# Patient Record
Sex: FEMALE | Race: WHITE | NOT HISPANIC OR LATINO | URBAN - METROPOLITAN AREA
[De-identification: names, ages, dates, MRNs, and addresses within clinical notes are randomized per-mention and may not be internally consistent; named-entity substitution may affect disease eponyms.]

---

## 2023-08-25 ENCOUNTER — OFFICE VISIT (OUTPATIENT)
Dept: FAMILY MEDICINE CLINIC | Facility: CLINIC | Age: 33
End: 2023-08-25
Payer: COMMERCIAL

## 2023-08-25 VITALS
HEART RATE: 103 BPM | DIASTOLIC BLOOD PRESSURE: 73 MMHG | OXYGEN SATURATION: 97 % | BODY MASS INDEX: 26.61 KG/M2 | SYSTOLIC BLOOD PRESSURE: 107 MMHG | HEIGHT: 59 IN | TEMPERATURE: 95.4 F | WEIGHT: 132 LBS

## 2023-08-25 DIAGNOSIS — Z76.89 ENCOUNTER TO ESTABLISH CARE: Primary | ICD-10-CM

## 2023-08-25 DIAGNOSIS — F43.10 PTSD (POST-TRAUMATIC STRESS DISORDER): ICD-10-CM

## 2023-08-25 DIAGNOSIS — F41.1 GAD (GENERALIZED ANXIETY DISORDER): ICD-10-CM

## 2023-08-25 DIAGNOSIS — Z86.59 HISTORY OF DEPRESSION: ICD-10-CM

## 2023-08-25 PROCEDURE — 99204 OFFICE O/P NEW MOD 45 MIN: CPT | Performed by: NURSE PRACTITIONER

## 2023-08-25 RX ORDER — GABAPENTIN 100 MG/1
100 CAPSULE ORAL 3 TIMES DAILY
COMMUNITY
Start: 2023-08-07

## 2023-08-25 RX ORDER — PAROXETINE 10 MG/1
15 TABLET, FILM COATED ORAL DAILY
COMMUNITY
Start: 2023-07-31 | End: 2023-08-25 | Stop reason: SDUPTHER

## 2023-08-25 RX ORDER — PRAZOSIN HYDROCHLORIDE 1 MG/1
1 CAPSULE ORAL 3 TIMES DAILY
COMMUNITY
Start: 2023-07-31 | End: 2023-08-25 | Stop reason: SDUPTHER

## 2023-08-25 RX ORDER — IBUPROFEN 600 MG/1
600 TABLET ORAL EVERY 6 HOURS PRN
COMMUNITY
Start: 2023-06-17 | End: 2024-06-16

## 2023-08-25 RX ORDER — DOXEPIN HYDROCHLORIDE 50 MG/1
150 CAPSULE ORAL
Qty: 30 CAPSULE | Refills: 0 | Status: SHIPPED | OUTPATIENT
Start: 2023-08-25

## 2023-08-25 RX ORDER — BUPRENORPHINE AND NALOXONE 8; 2 MG/1; MG/1
FILM, SOLUBLE BUCCAL; SUBLINGUAL DAILY
COMMUNITY

## 2023-08-25 RX ORDER — QUETIAPINE FUMARATE 100 MG/1
100 TABLET, FILM COATED ORAL 2 TIMES DAILY
Qty: 120 TABLET | Refills: 0 | Status: SHIPPED | OUTPATIENT
Start: 2023-08-25

## 2023-08-25 RX ORDER — PAROXETINE 10 MG/1
15 TABLET, FILM COATED ORAL DAILY
Qty: 90 TABLET | Refills: 0 | Status: SHIPPED | OUTPATIENT
Start: 2023-08-25

## 2023-08-25 RX ORDER — PRAZOSIN HYDROCHLORIDE 1 MG/1
1 CAPSULE ORAL 3 TIMES DAILY
Qty: 90 CAPSULE | Refills: 1 | Status: SHIPPED | OUTPATIENT
Start: 2023-08-25

## 2023-08-25 RX ORDER — QUETIAPINE FUMARATE 300 MG/1
300 TABLET, FILM COATED ORAL
COMMUNITY
Start: 2023-08-10 | End: 2023-08-25 | Stop reason: SDUPTHER

## 2023-08-25 RX ORDER — QUETIAPINE FUMARATE 300 MG/1
300 TABLET, FILM COATED ORAL
Qty: 60 TABLET | Refills: 0 | Status: SHIPPED | OUTPATIENT
Start: 2023-08-25

## 2023-08-25 RX ORDER — NALOXONE HYDROCHLORIDE 4 MG/.1ML
SPRAY NASAL
COMMUNITY
Start: 2023-07-31

## 2023-08-25 RX ORDER — DOXEPIN HYDROCHLORIDE 50 MG/1
CAPSULE ORAL
COMMUNITY
Start: 2023-08-10 | End: 2023-08-25 | Stop reason: SDUPTHER

## 2023-08-25 RX ORDER — QUETIAPINE FUMARATE 100 MG/1
100 TABLET, FILM COATED ORAL 2 TIMES DAILY
COMMUNITY
Start: 2023-08-10 | End: 2023-08-25 | Stop reason: SDUPTHER

## 2023-08-25 NOTE — PATIENT INSTRUCTIONS
Continue Medications as prescribed  Schedule appointment with psychiatry to continue and manage your medications  Schedule appointment for therapy  Schedule appointment for physical and labs  Call with problems/concerns

## 2023-08-25 NOTE — PROGRESS NOTES
[facility]        NAME: Alex Lr is a 33 y.o. female  : 1990    MRN: [id number]  DATE: 2023  TIME: 4:29 PM    Assessment and Plan   Encounter to establish care [Z76.89]  1. Encounter to establish care        2. PTSD (post-traumatic stress disorder)  prazosin (MINIPRESS) 1 mg capsule      3. HAI (generalized anxiety disorder)  PARoxetine (PAXIL) 10 mg tablet    QUEtiapine (SEROquel) 300 mg tablet    QUEtiapine (SEROquel) 100 mg tablet    doxepin (SINEquan) 50 mg capsule      4. History of depression  PARoxetine (PAXIL) 10 mg tablet            Patient Instructions     Patient Instructions   Continue Medications as prescribed  Schedule appointment with psychiatry to continue and manage your medications  Schedule appointment for therapy  Schedule appointment for physical and labs  Call with problems/concerns          Chief Complaint     Chief Complaint   Patient presents with   • Establish Care     Pt was recently discharged about 4 wks ago from [facility] then went to [facility]. She needs refills on her medications. History of Present Illness       New patient. She was discharged recently at [facility]. She was prescribed medications for HAI, PTSD, Depression. She needs to schedule appointment with psychiatrist as out patient for medication management but has not set that up. She also needs to schedule therapy. She brought a medication list from [facility] refills. Medications reviewed with patient. She states the medications have been working to manage her symptoms. She has a history of drug abuse-taking Suboxone. Review of Systems   Review of Systems   Constitutional: Negative for activity change and fever. Respiratory: Negative for chest tightness and shortness of breath. Cardiovascular: Negative for chest pain. Gastrointestinal: Negative for abdominal pain. Neurological: Negative for weakness.    Psychiatric/Behavioral: Negative for dysphoric mood. The patient is not nervous/anxious. Current Medications       Current Outpatient Medications:   •  buprenorphine-naloxone (Suboxone) 8-2 mg, Place under the tongue daily, Disp: , Rfl:   •  doxepin (SINEquan) 50 mg capsule, Take 3 capsules (150 mg total) by mouth daily at bedtime as needed for sleep (anxiety), Disp: 30 capsule, Rfl: 0  •  gabapentin (NEURONTIN) 100 mg capsule, Take 100 mg by mouth 3 (three) times a day, Disp: , Rfl:   •  ibuprofen (MOTRIN) 600 mg tablet, Take 600 mg by mouth every 6 (six) hours as needed, Disp: , Rfl:   •  naloxone (NARCAN) 4 mg/0.1 mL nasal spray, ADMINISTER A SINGLE spray INTO ONE NOSTRIL CALL 911 MAY REPEAT ONCE, Disp: , Rfl:   •  PARoxetine (PAXIL) 10 mg tablet, Take 1.5 tablets (15 mg total) by mouth daily, Disp: 90 tablet, Rfl: 0  •  prazosin (MINIPRESS) 1 mg capsule, Take 1 capsule (1 mg total) by mouth 3 (three) times a day, Disp: 90 capsule, Rfl: 1  •  QUEtiapine (SEROquel) 100 mg tablet, Take 1 tablet (100 mg total) by mouth 2 (two) times a day, Disp: 120 tablet, Rfl: 0  •  QUEtiapine (SEROquel) 300 mg tablet, Take 1 tablet (300 mg total) by mouth daily at bedtime, Disp: 60 tablet, Rfl: 0    Current Allergies     Allergies as of 08/25/2023 - Reviewed 08/25/2023   Allergen Reaction Noted   • Pollen extract Nasal Congestion 01/16/2022            The following portions of the patient's history were reviewed and updated as appropriate: allergies, current medications, past family history, past medical history, past social history, past surgical history and problem list.     Past Medical History:   Diagnosis Date   • Anxiety    • Depression    • Insomnia    • PTSD (post-traumatic stress disorder)        History reviewed. No pertinent surgical history.     Family History   Problem Relation Age of Onset   • Diabetes Mother    • Depression Mother    • Bipolar disorder Mother    • Anxiety disorder Mother    • COPD Father    • Emphysema Father    • Alcohol abuse Father    • Diabetes Maternal Grandfather    • Alcohol abuse Maternal Grandfather          Medications have been verified. Objective   /73 (BP Location: Left arm, Patient Position: Sitting, Cuff Size: Standard)   Pulse 103   Temp (!) 95.4 °F (35.2 °C)   Ht 4' 11" (1.499 m)   Wt 59.9 kg (132 lb)   SpO2 97%   BMI 26.66 kg/m²        Physical Exam     Physical Exam  Vitals and nursing note reviewed. Constitutional:       General: She is not in acute distress. Appearance: Normal appearance. She is not ill-appearing. HENT:      Head: Normocephalic. Eyes:      Extraocular Movements: Extraocular movements intact. Cardiovascular:      Rate and Rhythm: Normal rate and regular rhythm. Heart sounds: Normal heart sounds. No murmur heard. No friction rub. No gallop. Pulmonary:      Effort: Pulmonary effort is normal. No respiratory distress. Breath sounds: Normal breath sounds. No wheezing. Musculoskeletal:         General: Normal range of motion. Cervical back: Normal range of motion. Skin:     General: Skin is warm and dry. Coloration: Skin is not pale. Findings: No erythema. Neurological:      Mental Status: She is alert and oriented to person, place, and time. Psychiatric:         Mood and Affect: Mood normal.         Behavior: Behavior normal.         Thought Content:  Thought content normal.         Judgment: Judgment normal.           PHQ-2/9 Depression Screening    Little interest or pleasure in doing things: 0 - not at all  Feeling down, depressed, or hopeless: 2 - more than half the days  PHQ-2 Score: 2  PHQ-2 Interpretation: Negative depression screen

## 2023-09-01 DIAGNOSIS — F41.1 GAD (GENERALIZED ANXIETY DISORDER): ICD-10-CM

## 2023-09-05 RX ORDER — DOXEPIN HYDROCHLORIDE 50 MG/1
CAPSULE ORAL
Qty: 30 CAPSULE | Refills: 0 | OUTPATIENT
Start: 2023-09-05

## 2023-10-12 DIAGNOSIS — Z86.59 HISTORY OF DEPRESSION: ICD-10-CM

## 2023-10-12 DIAGNOSIS — F41.1 GAD (GENERALIZED ANXIETY DISORDER): ICD-10-CM

## 2023-10-12 RX ORDER — PAROXETINE 10 MG/1
TABLET, FILM COATED ORAL
Qty: 90 TABLET | Refills: 0 | Status: SHIPPED | OUTPATIENT
Start: 2023-10-12

## 2023-10-13 RX ORDER — QUETIAPINE FUMARATE 100 MG/1
100 TABLET, FILM COATED ORAL 2 TIMES DAILY
Qty: 120 TABLET | Refills: 0 | Status: SHIPPED | OUTPATIENT
Start: 2023-10-13

## 2023-10-13 RX ORDER — QUETIAPINE FUMARATE 300 MG/1
300 TABLET, FILM COATED ORAL
Qty: 60 TABLET | Refills: 0 | Status: SHIPPED | OUTPATIENT
Start: 2023-10-13

## 2024-01-15 DIAGNOSIS — F41.1 GAD (GENERALIZED ANXIETY DISORDER): ICD-10-CM

## 2024-01-16 NOTE — TELEPHONE ENCOUNTER
Patient is due physical. I saw her once to establish care 8/25/23 and said I would refill meds but she needed to f/up with psychiatry for med management. Did she schedule appointment with psych?

## 2024-01-19 RX ORDER — QUETIAPINE FUMARATE 100 MG/1
100 TABLET, FILM COATED ORAL 2 TIMES DAILY
Qty: 120 TABLET | Refills: 0 | Status: SHIPPED | OUTPATIENT
Start: 2024-01-19

## 2024-01-19 RX ORDER — QUETIAPINE FUMARATE 300 MG/1
300 TABLET, FILM COATED ORAL
Qty: 60 TABLET | Refills: 0 | Status: SHIPPED | OUTPATIENT
Start: 2024-01-19

## 2024-01-19 NOTE — TELEPHONE ENCOUNTER
I sent a refill so she is not without medications but she needs to schedule annual physical and also needs to schedule with psychiatry for management of psych meds.

## 2024-03-17 DIAGNOSIS — F41.1 GAD (GENERALIZED ANXIETY DISORDER): ICD-10-CM

## 2024-03-19 RX ORDER — QUETIAPINE FUMARATE 300 MG/1
300 TABLET, FILM COATED ORAL
Qty: 30 TABLET | Refills: 0 | Status: SHIPPED | OUTPATIENT
Start: 2024-03-19

## 2024-03-19 RX ORDER — QUETIAPINE FUMARATE 100 MG/1
100 TABLET, FILM COATED ORAL 2 TIMES DAILY
Qty: 120 TABLET | Refills: 0 | Status: SHIPPED | OUTPATIENT
Start: 2024-03-19

## 2025-02-09 DIAGNOSIS — F41.1 GAD (GENERALIZED ANXIETY DISORDER): ICD-10-CM

## 2025-02-10 RX ORDER — QUETIAPINE FUMARATE 100 MG/1
100 TABLET, FILM COATED ORAL 2 TIMES DAILY
Qty: 120 TABLET | Refills: 0 | OUTPATIENT
Start: 2025-02-10

## 2025-06-18 ENCOUNTER — TELEPHONE (OUTPATIENT)
Dept: FAMILY MEDICINE CLINIC | Facility: CLINIC | Age: 35
End: 2025-06-18

## 2025-06-18 DIAGNOSIS — Z00.00 ANNUAL PHYSICAL EXAM: Primary | ICD-10-CM

## 2025-06-18 DIAGNOSIS — F41.1 GAD (GENERALIZED ANXIETY DISORDER): ICD-10-CM

## 2025-06-18 RX ORDER — QUETIAPINE FUMARATE 100 MG/1
100 TABLET, FILM COATED ORAL 2 TIMES DAILY
Qty: 120 TABLET | Refills: 0 | OUTPATIENT
Start: 2025-06-18

## 2025-06-18 RX ORDER — QUETIAPINE FUMARATE 300 MG/1
300 TABLET, FILM COATED ORAL
Qty: 30 TABLET | Refills: 0 | OUTPATIENT
Start: 2025-06-18

## 2025-06-18 NOTE — TELEPHONE ENCOUNTER
Patient called to request a refill for their both quetiapine advised a refill was requested on 6/18 and is pending approval. Patient verbalized understanding and is in agreement.     Does the patient have enough for 3 days?   [] Yes   [x] No - Send as HP to POD - pt ran out of pills last night

## 2025-06-18 NOTE — TELEPHONE ENCOUNTER
Patient called again to speak to someone in the office about getting her medications. Was told to tell the patient that she can call in the morning to see if there were any openings. The doctors were gone for the day. Patient has not been seen in over 2 years and is insisting on getting her refills. Please advise back to patient if needed

## 2025-06-18 NOTE — TELEPHONE ENCOUNTER
Patient returned call after receiving message regarding her refill request and needing to be seen prior to any refills being prescribed. No available apts until 06/30, patient is out of medication. Warm transferred to office staff to advise patient

## 2025-06-24 LAB
ALBUMIN SERPL-MCNC: 4.6 G/DL (ref 3.9–4.9)
ALP SERPL-CCNC: 53 IU/L (ref 44–121)
ALT SERPL-CCNC: 14 IU/L (ref 0–32)
AST SERPL-CCNC: 17 IU/L (ref 0–40)
BILIRUB SERPL-MCNC: 0.5 MG/DL (ref 0–1.2)
BUN SERPL-MCNC: 11 MG/DL (ref 6–20)
BUN/CREAT SERPL: 15 (ref 9–23)
CALCIUM SERPL-MCNC: 9.8 MG/DL (ref 8.7–10.2)
CHLORIDE SERPL-SCNC: 105 MMOL/L (ref 96–106)
CHOLEST SERPL-MCNC: 180 MG/DL (ref 100–199)
CO2 SERPL-SCNC: 20 MMOL/L (ref 20–29)
CREAT SERPL-MCNC: 0.73 MG/DL (ref 0.57–1)
EGFR: 111 ML/MIN/1.73
GLOBULIN SER-MCNC: 2.9 G/DL (ref 1.5–4.5)
GLUCOSE SERPL-MCNC: 103 MG/DL (ref 70–99)
HDLC SERPL-MCNC: 48 MG/DL
LDLC SERPL CALC-MCNC: 116 MG/DL (ref 0–99)
LDLC/HDLC SERPL: 2.4 RATIO (ref 0–3.2)
POTASSIUM SERPL-SCNC: 4.1 MMOL/L (ref 3.5–5.2)
PROT SERPL-MCNC: 7.5 G/DL (ref 6–8.5)
SL AMB VLDL CHOLESTEROL CALC: 16 MG/DL (ref 5–40)
SODIUM SERPL-SCNC: 141 MMOL/L (ref 134–144)
TRIGL SERPL-MCNC: 86 MG/DL (ref 0–149)